# Patient Record
Sex: MALE | Race: WHITE | URBAN - METROPOLITAN AREA
[De-identification: names, ages, dates, MRNs, and addresses within clinical notes are randomized per-mention and may not be internally consistent; named-entity substitution may affect disease eponyms.]

---

## 2023-03-31 ENCOUNTER — OFFICE VISIT (OUTPATIENT)
Dept: URBAN - METROPOLITAN AREA CLINIC 43 | Facility: CLINIC | Age: 58
End: 2023-03-31
Payer: COMMERCIAL

## 2023-03-31 DIAGNOSIS — E11.9 TYPE 2 DIABETES MELLITUS W/O COMPLICATION: Primary | ICD-10-CM

## 2023-03-31 DIAGNOSIS — H52.223 REGULAR ASTIGMATISM, BILATERAL: ICD-10-CM

## 2023-03-31 PROCEDURE — 99204 OFFICE O/P NEW MOD 45 MIN: CPT

## 2023-03-31 ASSESSMENT — KERATOMETRY
OS: 44.63
OD: 44.50

## 2023-03-31 ASSESSMENT — INTRAOCULAR PRESSURE
OS: 14
OD: 14

## 2023-03-31 NOTE — IMPRESSION/PLAN
Impression: Regular astigmatism, bilateral: H52.223. Plan: Poor cooperation/understanding during refraction. Attempted single numbers, pt was able to read to 20/100 but refused to attempt reading smaller numbers. No physiological explanation for reduced BCVA, suspect pt's understanding is limiting ability to test vision. Retinoscopy is close to autorefraction. Pt uses OTC +1.25 readers. Will release SRx based on ret and auto. RTC if unable to adapt, recommend impact resistant lenses and AR coating.

## 2023-03-31 NOTE — IMPRESSION/PLAN
Impression: Type 2 diabetes mellitus w/o complication: K85.0. Plan: Ed pt on clinical findings. DWP that there is no retinopathy or macular edema present on exam today. Reminded pt that ADA recommends target A1c of 7.0 or less to minimize risk of retinopathy as well as other systemic complications of DM. Advised pt to RTC if vision changes. Recommend yearly DFE w/ fundus photos and MAC OCT to detect early signs of diabetic retinopathy. Advised pt to visit with PCP regularly, will send today's notes to PCP.